# Patient Record
Sex: FEMALE | Race: WHITE | NOT HISPANIC OR LATINO
[De-identification: names, ages, dates, MRNs, and addresses within clinical notes are randomized per-mention and may not be internally consistent; named-entity substitution may affect disease eponyms.]

---

## 2021-03-17 ENCOUNTER — APPOINTMENT (OUTPATIENT)
Age: 26
End: 2021-03-17

## 2021-06-10 PROBLEM — Z00.00 ENCOUNTER FOR PREVENTIVE HEALTH EXAMINATION: Status: ACTIVE | Noted: 2021-06-10

## 2021-07-06 ENCOUNTER — NON-APPOINTMENT (OUTPATIENT)
Age: 26
End: 2021-07-06

## 2021-07-06 ENCOUNTER — APPOINTMENT (OUTPATIENT)
Dept: CARDIOLOGY | Facility: CLINIC | Age: 26
End: 2021-07-06
Payer: COMMERCIAL

## 2021-07-06 VITALS — OXYGEN SATURATION: 98 % | HEIGHT: 67 IN | BODY MASS INDEX: 40.65 KG/M2 | HEART RATE: 95 BPM | WEIGHT: 259 LBS

## 2021-07-06 VITALS — SYSTOLIC BLOOD PRESSURE: 132 MMHG | DIASTOLIC BLOOD PRESSURE: 80 MMHG

## 2021-07-06 DIAGNOSIS — E66.9 OBESITY, UNSPECIFIED: ICD-10-CM

## 2021-07-06 DIAGNOSIS — Z82.49 FAMILY HISTORY OF ISCHEMIC HEART DISEASE AND OTHER DISEASES OF THE CIRCULATORY SYSTEM: ICD-10-CM

## 2021-07-06 DIAGNOSIS — Z78.9 OTHER SPECIFIED HEALTH STATUS: ICD-10-CM

## 2021-07-06 DIAGNOSIS — R20.2 PARESTHESIA OF SKIN: ICD-10-CM

## 2021-07-06 DIAGNOSIS — Z83.49 FAMILY HISTORY OF OTHER ENDOCRINE, NUTRITIONAL AND METABOLIC DISEASES: ICD-10-CM

## 2021-07-06 DIAGNOSIS — E03.9 HYPOTHYROIDISM, UNSPECIFIED: ICD-10-CM

## 2021-07-06 DIAGNOSIS — Z83.3 FAMILY HISTORY OF DIABETES MELLITUS: ICD-10-CM

## 2021-07-06 DIAGNOSIS — M25.569 PAIN IN UNSPECIFIED KNEE: ICD-10-CM

## 2021-07-06 DIAGNOSIS — F41.9 ANXIETY DISORDER, UNSPECIFIED: ICD-10-CM

## 2021-07-06 DIAGNOSIS — Z01.810 ENCOUNTER FOR PREPROCEDURAL CARDIOVASCULAR EXAMINATION: ICD-10-CM

## 2021-07-06 DIAGNOSIS — E28.2 POLYCYSTIC OVARIAN SYNDROME: ICD-10-CM

## 2021-07-06 DIAGNOSIS — F32.9 MAJOR DEPRESSIVE DISORDER, SINGLE EPISODE, UNSPECIFIED: ICD-10-CM

## 2021-07-06 PROCEDURE — 99204 OFFICE O/P NEW MOD 45 MIN: CPT | Mod: 25

## 2021-07-06 PROCEDURE — 93000 ELECTROCARDIOGRAM COMPLETE: CPT | Mod: NC

## 2021-07-06 RX ORDER — NORETHINDRONE ACETATE AND ETHINYL ESTRADIOL AND FERROUS FUMARATE 1MG-20(24)
1-20 KIT ORAL DAILY
Refills: 0 | Status: ACTIVE | COMMUNITY
Start: 2021-06-25

## 2021-07-06 RX ORDER — BUPROPION HYDROCHLORIDE 300 MG/1
300 TABLET, FILM COATED, EXTENDED RELEASE ORAL DAILY
Refills: 0 | Status: ACTIVE | COMMUNITY

## 2021-07-06 RX ORDER — OMEPRAZOLE 20 MG/1
20 CAPSULE, DELAYED RELEASE ORAL AS DIRECTED
Refills: 0 | Status: ACTIVE | COMMUNITY
Start: 2021-01-20

## 2021-07-06 RX ORDER — DESVENLAFAXINE 25 MG/1
25 TABLET, EXTENDED RELEASE ORAL
Qty: 30 | Refills: 0 | Status: ACTIVE | COMMUNITY
Start: 2021-06-21

## 2021-07-12 ENCOUNTER — APPOINTMENT (OUTPATIENT)
Dept: CARDIOLOGY | Facility: CLINIC | Age: 26
End: 2021-07-12
Payer: COMMERCIAL

## 2021-07-12 ENCOUNTER — TRANSCRIPTION ENCOUNTER (OUTPATIENT)
Age: 26
End: 2021-07-12

## 2021-07-12 PROCEDURE — 93306 TTE W/DOPPLER COMPLETE: CPT

## 2021-07-15 PROBLEM — E28.2 POLYCYSTIC OVARIAN SYNDROME: Status: ACTIVE | Noted: 2021-07-06

## 2021-07-15 PROBLEM — E03.9 HYPOTHYROIDISM: Status: ACTIVE | Noted: 2021-07-06

## 2021-07-15 PROBLEM — E66.9 OBESITY: Status: ACTIVE | Noted: 2021-07-06

## 2021-07-15 PROBLEM — Z01.810 PREPROCEDURAL CARDIOVASCULAR EXAMINATION: Status: ACTIVE | Noted: 2021-07-06

## 2021-08-01 NOTE — DISCUSSION/SUMMARY
[FreeTextEntry1] : For preop evaluation I ordered an echocardiogram to assess left and right ventricular size and systolic function.  Knowledge of ejection fraction would be very helpful to the anesthesiologist and the surgeon.  I do not see any reason for a stress test.\par \par Based on the results of above and the presurgical tests,  cardiac clearance will follow.\par \par Addendum: 7/15/2021.\par \par Echocardiogram showed left ventricular ejection fraction of 60 to 65%.  Diastolic function were normal.  Right ventricular size and systolic function were normal.  There was trace mitral and tricuspid regurgitation.  Study was essentially normal.\par \par He is cleared from cardiac point of view for bariatric surgery provided her presurgical  tests are within acceptable limits.

## 2021-08-01 NOTE — HISTORY OF PRESENT ILLNESS
[FreeTextEntry1] : 25-year-old lady was referred by Darren Bee NP From Dr. Pozo's office for cardiac evaluation and clearance for bariatric surgery. Her PCP is Dr. Aziza Celis.\par \par She has long-standing history of obesity.  She tried to lose weight in the past.  She was unsuccessful in keeping it down. In 2014 she was diagnosed to have hypothyroidism and was put on thyroid medications.  In 2017 she did  Cross[employer] training and  lost over 80 pounds.  She stopped taking levothyroxine in 2016 because as per her doctor the indication was not a very strong one for the use of thyroid medication.  Since then she has gained all her weight back. For the last 1 year she gained 25 pounds in spite of being active.\par \par She wants to have bariatric procedure done.  Hence cardiology consult was requested..  She goes to gym 4 times a week.  She does  treadmill exercise for about 15 minutes and also does a lot of weight training.  She denies any exertional chest pain, palpitation, shortness of breath or dizziness.\par \par She denies any history of heart or lung disease, hypertension or hyperlipidemia or diabetes.  She takes bupropion for anxiety , Desvenlafaxine for depression and omeprazole for acid reflux. Palpiation has gone after she stopped drinking red bull.  She is also on birth control pills.\par

## 2022-05-12 ENCOUNTER — RESULT REVIEW (OUTPATIENT)
Age: 27
End: 2022-05-12